# Patient Record
Sex: FEMALE | Employment: UNEMPLOYED | URBAN - METROPOLITAN AREA
[De-identification: names, ages, dates, MRNs, and addresses within clinical notes are randomized per-mention and may not be internally consistent; named-entity substitution may affect disease eponyms.]

---

## 2019-10-31 ENCOUNTER — TRANSFERRED RECORDS (OUTPATIENT)
Dept: HEALTH INFORMATION MANAGEMENT | Facility: CLINIC | Age: 64
End: 2019-10-31

## 2019-11-06 ENCOUNTER — PATIENT OUTREACH (OUTPATIENT)
Dept: PLASTIC SURGERY | Facility: CLINIC | Age: 64
End: 2019-11-06

## 2019-11-06 DIAGNOSIS — F64.0 GENDER DYSPHORIA IN ADOLESCENT AND ADULT: Primary | ICD-10-CM

## 2019-11-06 NOTE — PROGRESS NOTES
"[facility]:  Care Coordination Note     SITUATION   Patient (Traci, She/Her) is a 64 year old who is receiving support for:  New Patient and Consult For (vaginoplasty prolapse)  .    BACKGROUND     Pt referred by Dr. Rios at [facility]. Pt reported she has a vaginal prolapse for last 4-5 years. Pt had vaginoplasty done in [facility] in 9/2002. Pt reported she had a hernia with repair, not sure if that effected prolapse. Pt in discomfort and states \"it just pops right out and can't keep it in.\"    ASSESSMENT     Surgery              CGC Assessment  [facility] ([facility]) Enrollment: Enrolled(2002 had vaginoplasty in [facility] )      PLAN          Nursing Interventions:   Scheduled pt earlier than \"new bottom\" due to pt situation.     Follow-up plan:  Pt scheduled for 11/26/19 5:30pm.       Carlos Hernandez  "

## 2019-11-07 ENCOUNTER — PRE VISIT (OUTPATIENT)
Dept: PLASTIC SURGERY | Facility: CLINIC | Age: 64
End: 2019-11-07

## 2019-11-07 NOTE — TELEPHONE ENCOUNTER
FUTURE VISIT INFORMATION      FUTURE VISIT INFORMATION:    Date: 11/26/19    Time: 5:30pm    [place] [facility] [place]  REFERRAL INFORMATION:    Referring provider:  Dr. Rios    Referring providers clinic:  [facility]    Reason for visit/diagnosis  vaginoplasty prolapse    RECORDS REQUESTED FROM:       Clinic name Comments Records Status Imaging Status   [facility] Request for recs sent 11/7/19- received and sent to scanning 11/7 EPIC

## 2019-11-26 ENCOUNTER — OFFICE VISIT (OUTPATIENT)
Dept: PLASTIC SURGERY | Facility: CLINIC | Age: 64
End: 2019-11-26
Payer: COMMERCIAL

## 2019-11-26 VITALS
OXYGEN SATURATION: 95 % | DIASTOLIC BLOOD PRESSURE: 81 MMHG | SYSTOLIC BLOOD PRESSURE: 127 MMHG | WEIGHT: 180.5 LBS | HEIGHT: 62 IN | BODY MASS INDEX: 33.21 KG/M2 | HEART RATE: 57 BPM

## 2019-11-26 DIAGNOSIS — F64.0 GENDER DYSPHORIA IN ADULT: Primary | ICD-10-CM

## 2019-11-26 DIAGNOSIS — N81.10 VAGINAL PROLAPSE: ICD-10-CM

## 2019-11-26 RX ORDER — ESTRADIOL 1 MG/G
GEL TOPICAL
Refills: 3 | COMMUNITY
Start: 2019-11-09

## 2019-11-26 ASSESSMENT — PAIN SCALES - GENERAL: PAINLEVEL: NO PAIN (0)

## 2019-11-26 ASSESSMENT — MIFFLIN-ST. JEOR: SCORE: 1326.99

## 2019-11-26 NOTE — LETTER
11/26/2019       RE: Traci Limon  [address] [address]     Dear Colleague,    Thank you for referring your patient, Traci Limon, to the [facility] PLASTIC AND RECONSTRUCTIVE SURGERY at [facility]. Please see a copy of my visit note below.    REFERRING PROVIDER: Abdi Rios    REASON FOR CONSULTATION: Vaginal prolapse status post vaginoplasty.    HPI: Patient is a 64-year-old trans-woman who prefers she her pronouns, referred to me by Dr. Abdi Rios for possible surgical evaluation and management of vaginal prolapse status post vaginoplasty for gender dysphoria.  Patient states she underwent penile inversion vaginoplasty in [place] 17 years ago, in 2002.  Since then, patient has been doing quite well other than a progressively worsening and enlarging vaginal prolapse.  She reports that this is severe enough at this time to keep her from exercising due to pain and discomfort.  Patient feels that the prolapse occurred due to working as a  and lifting heavy objects on a routine and daily basis.  She feels that when she works out intensely, the vaginal prolapse worsens.  She feels that the prolapse is associated with bowel given that her bowel movements can change with severity of the prolapse.  She is currently not dilating her cavity and has not dilate for the entire year so far.  She has sensation clitoris and is able to achieve orgasm.  Reports that her urinary stream is weak and that she sometimes has difficulty completely emptying her bladder.  She does have nighttime frequency which has not been evaluated.  Her bowel movements are normal.  Of note, patient underwent an inguinal hernia repair on the right side 1.5 years ago.  She is doing well from that standpoint.  She has been on patch estrogen hormone therapy for the past 20 years.    MEDS:   Prior to Admission medications    Medication Sig Start Date End Date Taking?  "Authorizing Provider   DIVIGEL 1 MG/GM GEL APPLY 1 PACKET TO UPPER THIGH DAILY AS DIRECTED 11/9/19  Yes Reported, Patient   Pediatric Multivitamins-Fl (MULTIVITAMIN WITH 1 MG FLUORIDE) 1 MG CHEW Take 1 tablet by mouth daily   Yes Reported, Patient       ALLERGIES: No Known Allergies    PMH: None.    PSH: Vaginoplasty, back surgery for stenosis and fusion, right shoulder surgery x 3, bilateral ulnar nerve transpositions, and right inguinal hernia repair.    SH:   Social History     Tobacco Use     Smoking status: Never Smoker     Smokeless tobacco: Never Used   Substance Use Topics     Alcohol use: Not on file   Retired.    FH: Heart disease and cancer.    ROS: Denies chest pain, shortness of breath, diabetes, MI, CVA, DVT, PE, and bleeding disorders.    PHYSICAL EXAMINATION: /81 (BP Location: Left arm, Patient Position: Chair, Cuff Size: Adult Regular)   Pulse 57   Ht 1.575 m (5' 2\")   Wt 81.9 kg (180 lb 8 oz)   SpO2 95%   BMI 33.01 kg/m     General: In no acute distress.  Appears feminine.  Is accompanied by significant other.  Genital examination was performed the presence of a chaperone.  This reveals an overall feminine appearance.  Clitoris is sensate to light touch.  Urethral meatus appears to be patent.  Vaginal examination reveals a visible and grossly palpable prolapse of soft tissues that extended approximately 8 inch out from the vaginal introitus.  This is soft and nontender to palpation.  I was able to reduce this prolapse and examined the vaginal cavity with my digit.  This revealed well-healed cavity lining with a depth that is compatible with penetration as long as the prolapse is reduced.    ASSESSMENT: Vaginal prolapse status post penile inversion vaginoplasty.    PLAN: We discussed our options moving forward.  Given that this prolapse is associated with pain and difficulty with performing hobbies such as exercising, patient would like to work towards repair of this.  I recommended we " obtain an MRI of the pelvis with contrast to further characterize this prolapsing tissue.  I explained to the patient that she will likely require some type of intra-abdominal colpopexy for definitive management of this.  I will have the patient see Dr. Escobar in urology once imaging is obtained to potentially plan for this.  Will likely require combined external and intra-abdominal approach to safely dissect and identify the vaginal cavity that is prolapsing.  At that time, we may consider performing a urethroplasty if this would improve the patient's symptoms of weak urinary stream and nighttime frequency.  We will discuss further plans after MRI is obtained.  All questions were answered.    Total time spent with patient was 60 min of which greater than 50% was in counseling.    Michael Patterson MD

## 2019-11-26 NOTE — NURSING NOTE
"Chief Complaint   Patient presents with     Consult     new pt here for vaginoplasty 9/2002 in [facility], now vaginal prolapse x4-5 years       Vitals:    11/26/19 1653   BP: 127/81   BP Location: Left arm   Patient Position: Chair   Cuff Size: Adult Regular   Pulse: 57   SpO2: 95%   Weight: 81.9 kg (180 lb 8 oz)   Height: 1.575 m (5' 2\")       Body mass index is 33.01 kg/m .    Jasson Dinero, EMT    "

## 2019-11-26 NOTE — PROGRESS NOTES
REFERRING PROVIDER: Abdi Rios    REASON FOR CONSULTATION: Vaginal prolapse status post vaginoplasty.    HPI: Patient is a 64-year-old trans-woman who prefers she her pronouns, referred to me by Dr. Abdi Rios for possible surgical evaluation and management of vaginal prolapse status post vaginoplasty for gender dysphoria.  Patient states she underwent penile inversion vaginoplasty in [facility] 17 years ago, in 2002.  Since then, patient has been doing quite well other than a progressively worsening and enlarging vaginal prolapse.  She reports that this is severe enough at this time to keep her from exercising due to pain and discomfort.  Patient feels that the prolapse occurred due to working as a  and lifting heavy objects on a routine and daily basis.  She feels that when she works out intensely, the vaginal prolapse worsens.  She feels that the prolapse is associated with bowel given that her bowel movements can change with severity of the prolapse.  She is currently not dilating her cavity and has not dilate for the entire year so far.  She has sensation clitoris and is able to achieve orgasm.  Reports that her urinary stream is weak and that she sometimes has difficulty completely emptying her bladder.  She does have nighttime frequency which has not been evaluated.  Her bowel movements are normal.  Of note, patient underwent an inguinal hernia repair on the right side 1.5 years ago.  She is doing well from that standpoint.  She has been on patch estrogen hormone therapy for the past 20 years.    MEDS:   Prior to Admission medications    Medication Sig Start Date End Date Taking? Authorizing Provider   DIVIGEL 1 MG/GM GEL APPLY 1 PACKET TO UPPER THIGH DAILY AS DIRECTED 11/9/19  Yes Reported, Patient   Pediatric Multivitamins-Fl (MULTIVITAMIN WITH 1 MG FLUORIDE) 1 MG CHEW Take 1 tablet by mouth daily   Yes Reported, Patient       ALLERGIES: No Known Allergies    PMH: None.    PSH:  "Vaginoplasty, back surgery for stenosis and fusion, right shoulder surgery x 3, bilateral ulnar nerve transpositions, and right inguinal hernia repair.    SH:   Social History     Tobacco Use     Smoking status: Never Smoker     Smokeless tobacco: Never Used   Substance Use Topics     Alcohol use: Not on file   Retired.    FH: Heart disease and cancer.    ROS: Denies chest pain, shortness of breath, diabetes, MI, CVA, DVT, PE, and bleeding disorders.    PHYSICAL EXAMINATION: /81 (BP Location: Left arm, Patient Position: Chair, Cuff Size: Adult Regular)   Pulse 57   Ht 1.575 m (5' 2\")   Wt 81.9 kg (180 lb 8 oz)   SpO2 95%   BMI 33.01 kg/m    General: In no acute distress.  Appears feminine.  Is accompanied by significant other.  Genital examination was performed the presence of a chaperone.  This reveals an overall feminine appearance.  Clitoris is sensate to light touch.  Urethral meatus appears to be patent.  Vaginal examination reveals a visible and grossly palpable prolapse of soft tissues that extended approximately 8 inch out from the vaginal introitus.  This is soft and nontender to palpation.  I was able to reduce this prolapse and examined the vaginal cavity with my digit.  This revealed well-healed cavity lining with a depth that is compatible with penetration as long as the prolapse is reduced.    ASSESSMENT: Vaginal prolapse status post penile inversion vaginoplasty.    PLAN: We discussed our options moving forward.  Given that this prolapse is associated with pain and difficulty with performing hobbies such as exercising, patient would like to work towards repair of this.  I recommended we obtain an MRI of the pelvis with contrast to further characterize this prolapsing tissue.  I explained to the patient that she will likely require some type of intra-abdominal colpopexy for definitive management of this.  I will have the patient see Dr. Escobar in urology once imaging is obtained to " potentially plan for this.  Will likely require combined external and intra-abdominal approach to safely dissect and identify the vaginal cavity that is prolapsing.  At that time, we may consider performing a urethroplasty if this would improve the patient's symptoms of weak urinary stream and nighttime frequency.  We will discuss further plans after MRI is obtained.  All questions were answered.    Total time spent with patient was 60 min of which greater than 50% was in counseling.

## 2019-11-27 NOTE — PATIENT INSTRUCTIONS
You will need  to schedule a MRI.    Imaging Scheduling # [phone]     Please call our clinic for any questions,concerns,or worsening symptoms.      Clinic # [phone]        Fax [phone]

## 2019-12-11 DIAGNOSIS — F40.240 CLAUSTROPHOBIA: Primary | ICD-10-CM

## 2019-12-11 RX ORDER — LORAZEPAM 0.5 MG/1
0.5 TABLET ORAL ONCE
Qty: 1 TABLET | Refills: 0 | Status: SHIPPED | OUTPATIENT
Start: 2019-12-11 | End: 2019-12-11

## 2019-12-27 ENCOUNTER — ANCILLARY PROCEDURE (OUTPATIENT)
Dept: MRI IMAGING | Facility: CLINIC | Age: 64
End: 2019-12-27
Attending: PLASTIC SURGERY
Payer: COMMERCIAL

## 2019-12-27 DIAGNOSIS — F64.0 GENDER DYSPHORIA IN ADULT: ICD-10-CM

## 2019-12-27 DIAGNOSIS — N81.10 VAGINAL PROLAPSE: ICD-10-CM

## 2019-12-27 RX ORDER — GADOBUTROL 604.72 MG/ML
7.5 INJECTION INTRAVENOUS ONCE
Status: COMPLETED | OUTPATIENT
Start: 2019-12-27 | End: 2019-12-27

## 2019-12-27 RX ADMIN — GADOBUTROL 7.5 ML: 604.72 INJECTION INTRAVENOUS at 12:49

## 2019-12-27 NOTE — DISCHARGE INSTRUCTIONS
MRI Contrast Discharge Instructions    The IV contrast you received today will pass out of your body in your  urine. This will happen in the next 24 hours. You will not feel this process.  Your urine will not change color.    Drink at least 4 extra glasses of water or juice today (unless your doctor  has restricted your fluids). This reduces the stress on your kidneys.  You may take your regular medicines.    If you are on dialysis: It is best to have dialysis today.    If you have a reaction: Most reactions happen right away. If you have  any new symptoms after leaving the hospital (such as hives or swelling),  call your hospital at the correct number below. Or call your family doctor.  If you have breathing distress or wheezing, call 911.    Special instructions: ***    I have read and understand the above information.    Signature:______________________________________ Date:___________    Staff:__________________________________________ Date:___________     Time:__________    [place] [facility] Departments:    ___[place]: [phone]  ___[facility]: [phone]  ___[place]: [phone] ___[facility]: [phone]  ___[facility]: [phone]  ___[facility]: [phone]  ___[phone]  ___[facility]: [phone]  ___[phone]

## 2020-01-03 ENCOUNTER — DOCUMENTATION ONLY (OUTPATIENT)
Dept: SURGERY | Facility: CLINIC | Age: 65
End: 2020-01-03

## 2020-01-03 ENCOUNTER — MEDICAL CORRESPONDENCE (OUTPATIENT)
Dept: HEALTH INFORMATION MANAGEMENT | Facility: CLINIC | Age: 65
End: 2020-01-03

## 2020-01-03 DIAGNOSIS — K62.3 RECTAL PROLAPSE: Primary | ICD-10-CM

## 2020-01-06 ENCOUNTER — PATIENT OUTREACH (OUTPATIENT)
Dept: SURGERY | Facility: CLINIC | Age: 65
End: 2020-01-06

## 2020-01-06 NOTE — PROGRESS NOTES
Pt contacted regarding need for prolapse testing at [facility] and appt/flex sig with Dr Gong.  Referral has been faxed to [facility] and pt was instructed to call us when appt scheduled so we can schedule follow up appt with Dr. Gong for flex sig after results available from [facility].  DIrect phone number given for [facility] nurse line.

## 2020-01-13 ENCOUNTER — DOCUMENTATION ONLY (OUTPATIENT)
Dept: SURGERY | Facility: CLINIC | Age: 65
End: 2020-01-13

## 2020-01-13 NOTE — PROGRESS NOTES
Called [facility] to follow up on order. Per [facility], order has been received. Pt is not yet scheduled. [facility] had left 2 messages for pt.      Heladio Condon LPN

## 2020-01-20 ENCOUNTER — PATIENT OUTREACH (OUTPATIENT)
Dept: SURGERY | Facility: CLINIC | Age: 65
End: 2020-01-20

## 2020-01-20 NOTE — PROGRESS NOTES
Left message for pt.  Clarified on voicemail that we are awaiting results from [facility] to schedule flex sig with Dr. Gong.

## 2020-02-16 ENCOUNTER — HEALTH MAINTENANCE LETTER (OUTPATIENT)
Age: 65
End: 2020-02-16

## 2020-03-02 ENCOUNTER — TRANSFERRED RECORDS (OUTPATIENT)
Dept: HEALTH INFORMATION MANAGEMENT | Facility: CLINIC | Age: 65
End: 2020-03-02

## 2020-11-22 ENCOUNTER — HEALTH MAINTENANCE LETTER (OUTPATIENT)
Age: 65
End: 2020-11-22

## 2021-04-04 ENCOUNTER — HEALTH MAINTENANCE LETTER (OUTPATIENT)
Age: 66
End: 2021-04-04

## 2021-09-18 ENCOUNTER — HEALTH MAINTENANCE LETTER (OUTPATIENT)
Age: 66
End: 2021-09-18

## 2021-11-13 ENCOUNTER — HEALTH MAINTENANCE LETTER (OUTPATIENT)
Age: 66
End: 2021-11-13

## 2022-11-20 ENCOUNTER — HEALTH MAINTENANCE LETTER (OUTPATIENT)
Age: 67
End: 2022-11-20

## 2023-11-25 ENCOUNTER — HEALTH MAINTENANCE LETTER (OUTPATIENT)
Age: 68
End: 2023-11-25